# Patient Record
Sex: FEMALE | Race: WHITE | NOT HISPANIC OR LATINO | Employment: FULL TIME | URBAN - METROPOLITAN AREA
[De-identification: names, ages, dates, MRNs, and addresses within clinical notes are randomized per-mention and may not be internally consistent; named-entity substitution may affect disease eponyms.]

---
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
## 2022-09-19 ENCOUNTER — NURSE TRIAGE (OUTPATIENT)
Dept: NURSING | Facility: CLINIC | Age: 22
End: 2022-09-19

## 2022-09-19 ENCOUNTER — HOSPITAL ENCOUNTER (EMERGENCY)
Facility: CLINIC | Age: 22
Discharge: HOME | End: 2022-09-19
Attending: EMERGENCY MEDICINE | Admitting: EMERGENCY MEDICINE
Payer: COMMERCIAL

## 2022-09-19 VITALS
OXYGEN SATURATION: 98 % | DIASTOLIC BLOOD PRESSURE: 87 MMHG | RESPIRATION RATE: 20 BRPM | HEART RATE: 86 BPM | TEMPERATURE: 98.9 F | SYSTOLIC BLOOD PRESSURE: 123 MMHG

## 2022-09-19 DIAGNOSIS — J40 BRONCHITIS: ICD-10-CM

## 2022-09-19 LAB
FLUAV RNA SPEC QL NAA+PROBE: NEGATIVE
FLUBV RNA RESP QL NAA+PROBE: NEGATIVE
RSV RNA SPEC NAA+PROBE: NEGATIVE
SARS-COV-2 RNA RESP QL NAA+PROBE: NEGATIVE

## 2022-09-19 PROCEDURE — C9803 HOPD COVID-19 SPEC COLLECT: HCPCS

## 2022-09-19 PROCEDURE — 250N000012 HC RX MED GY IP 250 OP 636 PS 637: Performed by: EMERGENCY MEDICINE

## 2022-09-19 PROCEDURE — 87637 SARSCOV2&INF A&B&RSV AMP PRB: CPT | Performed by: EMERGENCY MEDICINE

## 2022-09-19 PROCEDURE — 99284 EMERGENCY DEPT VISIT MOD MDM: CPT | Mod: CS

## 2022-09-19 PROCEDURE — 93005 ELECTROCARDIOGRAM TRACING: CPT

## 2022-09-19 RX ORDER — PREDNISONE 20 MG/1
20 TABLET ORAL ONCE
Status: COMPLETED | OUTPATIENT
Start: 2022-09-19 | End: 2022-09-19

## 2022-09-19 RX ORDER — PREDNISONE 20 MG/1
40 TABLET ORAL DAILY
Qty: 8 TABLET | Refills: 0 | Status: SHIPPED | OUTPATIENT
Start: 2022-09-19 | End: 2022-09-23

## 2022-09-19 RX ADMIN — PREDNISONE 20 MG: 20 TABLET ORAL at 05:12

## 2022-09-19 ASSESSMENT — ENCOUNTER SYMPTOMS
CHILLS: 0
SHORTNESS OF BREATH: 1
ABDOMINAL PAIN: 0
NAUSEA: 0
WHEEZING: 1
CHEST TIGHTNESS: 1
VOMITING: 0
FEVER: 0
COUGH: 1

## 2022-09-19 ASSESSMENT — ACTIVITIES OF DAILY LIVING (ADL): ADLS_ACUITY_SCORE: 33

## 2022-09-19 NOTE — ED PROVIDER NOTES
History   Chief Complaint:  Shortness of Breath       HPI   Rosa Maria Melgoza is a 22 year old female with history of asthma who presents with her mother for evaluation of shortness of breath. The patient reports onset of shortness of breath around 1 week ago. She states this has worsened and she now had a nonproductive cough, chest tightness and wheezing today. She had been using her nebulizer and inhaler more at home with some relief. She was seen at [facility] yesterday where she was given prednisone 20 mg. She had a chest XR done which was clear. Her symptoms are not getting better leading to ED visit. The patient denies fever, chills, current chest pain, nausea, vomiting or abdominal pain. She is vaccinated against Covid. She denies any sick contacts.     XR chest 2 views PA and Lateral 9/18/2022: Lungs are clear. No pleural effusion. Heart size and pulmonary vascularity within normal limits.    Review of Systems   Constitutional: Negative for chills and fever.   Respiratory: Positive for cough, chest tightness, shortness of breath and wheezing.    Cardiovascular: Negative for chest pain.   Gastrointestinal: Negative for abdominal pain, nausea and vomiting.   All other systems reviewed and are negative.    Allergies:  The patient has no known allergies.     Medications:  Albuterol   Biaxin  Vestura   Duoneb  Deltasone     Past Medical History:     Depression  Severe learning disorder    Rheumatoid arthritis   Asthma     Past Surgical History:    [place] teeth extraction   Mammoplasty   Orthopedic surgery   ENT surgery      Family History:    Mother: hypothyroidism     Social History:  The patient presents to the ED with her mother  PCP: Margy Willis     Physical Exam     Patient Vitals for the past 24 hrs:   BP Temp Temp src Pulse Resp SpO2   09/19/22 0532 123/87 -- -- 86 20 98 %   09/19/22 0414 (!) 138/98 98.9  F (37.2  C) Temporal 113 20 97 %       Physical Exam  Nursing note and vitals  reviewed.  Constitutional: Well nourished.   Eyes: Conjunctiva normal.  Pupils are equal, round, and reactive to light.   ENT: Nose normal. Mucous membranes pink and moist. TM normal. No posterior oropharyngeal erythema/exudates.   Neck: Normal range of motion.  CVS: Sinus tachycardia.  Normal heart sounds.  No murmur.  Pulmonary: Lungs clear to auscultation bilaterally. No wheezes/rales/rhonchi.  GI: Abdomen soft. Nontender, nondistended. No rigidity or guarding.    MSK: No calf tenderness or swelling.  Neuro: Alert. Follows simple commands.  Skin: Skin is warm and dry. No rash noted.   Psychiatric: Normal affect.       Emergency Department Course   ECG  ECG taken at 0506,  NSR with sinus arrhythmia  Nonspecific ST abnormality   Abnormal ECG   Rate 87 bpm. VT interval 132 ms. QRS duration 96 ms. QT/QTc 360/433 ms. P-R-T axes 63 72 16.     Laboratory:  Labs Ordered and Resulted from Time of ED Arrival to Time of ED Departure   INFLUENZA A/B & SARS-COV2 PCR MULTIPLEX - Normal       Result Value    Influenza A PCR Negative      Influenza B PCR Negative      RSV PCR Negative      SARS CoV2 PCR Negative          Emergency Department Course:           Reviewed:  I reviewed nursing notes, vitals, past medical history and Care Everywhere    Assessments:  0448 I obtained history and examined the patient as noted above. Plan explained at this time    Interventions:  Prednisone, 20 mg, PO    Disposition:  The patient was discharged to home.     Impression & Plan   Medical Decision Making:  Patient is a 22-year-old female presenting with shortness of breath, cough, chest tightness and wheezing.  She is mildly tachycardic on arrival though in no significant distress.  Her repeat vital signs improved without intervention.  EKG without STEMI.  Low clinical suspicion for ACS or serious cardiopulmonary etiology.  She recently underwent a chest x-ray, I do not feel emergent blood work is warranted at this point in time.  Low  clinical suspicion for other etiology including but not limited to PE.  Strong suspicion that her presentation is more secondary to bronchitis.  I did discuss that I will increase her dose of prednisone to 40 mg daily for the next few days.  She reports taking 20 mg earlier today so an additional 20 mg given in the ED.  She has a nebulizer at home as well as albuterol.  I do not feel emergent antibiotics are warranted at this point in time.  She tested negative for COVID-19/influenza during her time in the ED.  She ambulates without hypoxia or significant work of breathing.  Monitor for fever, purulent sputum or should symptoms worsen or change to promptly represent to the ED for further evaluation.  Close PCP follow-up recommended.  Patient comfortable with plan of care.    Diagnosis:    ICD-10-CM    1. Bronchitis  J40        Discharge Medications:  New Prescriptions    PREDNISONE (DELTASONE) 20 MG TABLET    Take 2 tablets (40 mg) by mouth daily for 4 days       Scribe Disclosure:  Jimmy CANTRELL, am serving as a scribe at 4:26 AM on 9/19/2022 to document services personally performed by Melva Frye DO based on my observations and the provider's statements to me.            Melva Frye DO  09/19/22 1329

## 2022-09-19 NOTE — ED NOTES
Pt last used neb at 1130pm. Has been using inhaler non stop at home per patient. Declined offer for inhaler here.

## 2022-09-19 NOTE — ED TRIAGE NOTES
Pt to ER with c/o asthma attack, pt states diff  Than reg, pt states had covid awhile back, neg at home

## 2022-09-19 NOTE — TELEPHONE ENCOUNTER
Nurse Triage SBAR    Is this a 2nd Level Triage? NO    Situation: Patient calling with concerns of shortness of breath  .  Consent: not needed    Background:   Asthma  Assessment:   Patient states that earlier today she was seen for a really bad cough but now it has turned into shortness of breath and wheezing. Patient states they gave her a duo nebulizer for home treatment along with prednisone and has been taking clarithromycin for one week. Patient states since she has been using the clarithromycin she has been using her inhaler more. Patient states it is harder to get a breath and is taking quicker shorter breaths. She feels her inhaler isnt working much and the nebulizer is helping but still not able to get a good breath in, earlier today she was experiencing chest tightness but denies that currently.     Denies fever   Protocol Recommended Disposition:   Go to ED Now (Or PCP Triage), See More Appropriate Guideline    Recommendation: Advised patient to Go to ED now . Reviewed concerning symptoms and when to call back.         Solomon Mcclain RN [facility] 9/19/2022 3:40 AM          Reason for Disposition    [1] Wheezing (high pitched whistling sound) AND [2] previous asthma attacks or use of asthma medicines    [1] MODERATE asthma attack (e.g., SOB at rest, speaks in phrases, audible wheezes) AND [2] not resolved after 2 or 3 inhaler or nebulizer treatments given 20 minutes apart    Additional Information    Negative: SEVERE difficulty breathing (e.g., struggling for each breath, speaks in single words)    Negative: [1] Breathing stopped AND [2] hasn't returned    Negative: Choking on something    Negative: Bluish (or gray) lips or face now    Negative: Difficult to awaken or acting confused (e.g., disoriented, slurred speech)    Negative: Passed out (i.e., lost consciousness, collapsed and was not responding)    Negative: Wheezing started suddenly after medicine, an allergic food or bee  sting    Negative: Stridor    Negative: Slow, shallow and weak breathing    Negative: Sounds like a life-threatening emergency to the triager    Negative: Chest pain    Negative: SEVERE asthma attack (e.g., struggling for each breath, speaks in single words, loud wheezes, pulse >120)  (RED  Zone)    Negative: Bluish (or gray) lips or face now    Negative: Difficult to awaken or acting confused (e.g., disoriented, slurred speech)    Negative: Passed out (i.e., lost consciousness, collapsed and was not responding)    Negative: Wheezing started suddenly after medicine, an allergic food, or bee sting    Negative: Sounds like a life-threatening emergency to the triager    Negative: [1] MODERATE asthma attack (e.g., SOB at rest, speaks in phrases, audible wheezes) AND [2] doesn't have neb or inhaler available    Negative: Peak flow rate less than 50% of baseline level  (RED  Zone)    Negative: Oxygen level (e.g., pulse oximetry) 90 percent or lower    Negative: [1] Hospitalized before with asthma AND [2] feels the same now    Negative: Chest pain    Protocols used: BREATHING DIFFICULTY-A-AH, ASTHMA ATTACK-A-AH

## 2022-09-20 LAB
ATRIAL RATE - MUSE: 87 BPM
DIASTOLIC BLOOD PRESSURE - MUSE: NORMAL MMHG
INTERPRETATION ECG - MUSE: NORMAL
P AXIS - MUSE: 63 DEGREES
PR INTERVAL - MUSE: 132 MS
QRS DURATION - MUSE: 96 MS
QT - MUSE: 360 MS
QTC - MUSE: 433 MS
R AXIS - MUSE: 72 DEGREES
SYSTOLIC BLOOD PRESSURE - MUSE: NORMAL MMHG
T AXIS - MUSE: 16 DEGREES
VENTRICULAR RATE- MUSE: 87 BPM

## 2023-01-23 ENCOUNTER — HEALTH MAINTENANCE LETTER (OUTPATIENT)
Age: 23
End: 2023-01-23

## 2024-02-24 ENCOUNTER — HEALTH MAINTENANCE LETTER (OUTPATIENT)
Age: 24
End: 2024-02-24

## 2025-02-05 ENCOUNTER — TELEPHONE (OUTPATIENT)
Dept: PEDIATRICS | Facility: CLINIC | Age: 25
End: 2025-02-05

## 2025-02-05 ENCOUNTER — VIRTUAL VISIT (OUTPATIENT)
Dept: PEDIATRICS | Facility: CLINIC | Age: 25
End: 2025-02-05
Payer: COMMERCIAL

## 2025-02-05 DIAGNOSIS — E66.09 CLASS 1 OBESITY DUE TO EXCESS CALORIES WITH SERIOUS COMORBIDITY AND BODY MASS INDEX (BMI) OF 30.0 TO 30.9 IN ADULT: Primary | ICD-10-CM

## 2025-02-05 DIAGNOSIS — E66.811 CLASS 1 OBESITY DUE TO EXCESS CALORIES WITH SERIOUS COMORBIDITY AND BODY MASS INDEX (BMI) OF 30.0 TO 30.9 IN ADULT: Primary | ICD-10-CM

## 2025-02-05 DIAGNOSIS — R03.0 ELEVATED BLOOD PRESSURE READING: ICD-10-CM

## 2025-02-05 PROCEDURE — 98000 SYNCH AUDIO-VIDEO NEW SF 15: CPT | Performed by: PHYSICIAN ASSISTANT

## 2025-02-05 RX ORDER — DROSPIRENONE AND ETHINYL ESTRADIOL 0.02-3(28)
1 KIT ORAL DAILY
COMMUNITY

## 2025-02-05 ASSESSMENT — PATIENT HEALTH QUESTIONNAIRE - PHQ9
10. IF YOU CHECKED OFF ANY PROBLEMS, HOW DIFFICULT HAVE THESE PROBLEMS MADE IT FOR YOU TO DO YOUR WORK, TAKE CARE OF THINGS AT HOME, OR GET ALONG WITH OTHER PEOPLE: NOT DIFFICULT AT ALL
SUM OF ALL RESPONSES TO PHQ QUESTIONS 1-9: 0
SUM OF ALL RESPONSES TO PHQ QUESTIONS 1-9: 0

## 2025-02-05 NOTE — TELEPHONE ENCOUNTER
PRIOR AUTHORIZATION DENIED    Medication: SEMAGLUTIDE-WEIGHT MANAGEMENT 0.25 MG/0.5ML SC SOAJ  Insurance Company: [employer] - Phone [phone] Fax [phone]  Denial Date: 2/5/2025  Denial Reason(s):     Appeal Information:     Patient Notified: No

## 2025-02-05 NOTE — PROGRESS NOTES
"Rosa Maria is a 24 year old who is being evaluated via a billable video visit.    How would you like to obtain your AVS? MyChart  If the video visit is dropped, the invitation should be resent by: Send to e-mail at: [email or web address]  Will anyone else be joining your video visit? No      Assessment & Plan     Class 1 obesity due to excess calories with serious comorbidity and body mass index (BMI) of 30.0 to 30.9 in adult  Pt failed diet, exercise. Thyroid normal.  Overall good diet.  Has elevated bp  Would like to try GLP if covered  If not pt is willing to try Wellbutrin and naltrexone  Discussed side effect profile of both choices  Continue to work on heart healthy diet and exercise.     - semaglutide-weight management (WEGOVY) 0.25 MG/0.5ML pen  Dispense: 2 mL; Refill: 0    Elevated blood pressure reading  Schedule nurse only bp and weight check                  Subjective   Rosa Maria is a 24 year old, presenting for the following health issues:  Weight Check    Trying to loose weight. Working out, dieting, not seeing results.  Is wondering about the shot.  Has not tried formal programs in past.  Exercises- [employer] league once a week. Walks treadmill  Diet- not a snacker,   She does like carbs, not much for sweets.   Eats lean means, more protein heavy.  Salads 4-5x a week with a meal.   Drink- water, occasional coke.    Current weight  Wt-176#  Ht- 5'3\"  BMI 31.8            2/5/2025     5:10 PM   Additional Questions   Roomed by Tabatha TRACY   Accompanied by Self         2/5/2025     5:10 PM   Patient Reported Additional Medications   Patient reports taking the following new medications No     History of Present Illness       Reason for visit:  Weight managment  Symptom onset:  Today  Symptoms include:  NA  : NA.  : NA.  : NA.  Prior treatment description:  NA  What makes it worse:  NA  What makes it better:  NA    She eats 0-1 servings of fruits and vegetables daily.She consumes 0 sweetened beverage(s) daily.She " "exercises with enough effort to increase her heart rate 60 or more minutes per day.  She exercises with enough effort to increase her heart rate 3 or less days per week.   She is taking medications regularly.                   Objective    Vitals - Patient Reported  Weight (Patient Reported): 75.8 kg (167 lb)  Height (Patient Reported): 160 cm (5' 3\")  BMI (Based on Pt Reported Ht/Wt): 29.58  Pain Score: No Pain (0)        Physical Exam   GENERAL: alert and no distress  EYES: Eyes grossly normal to inspection.  No discharge or erythema, or obvious scleral/conjunctival abnormalities.  RESP: No audible wheeze, cough, or visible cyanosis.    SKIN: Visible skin clear. No significant rash, abnormal pigmentation or lesions.  NEURO: Cranial nerves grossly intact.  Mentation and speech appropriate for age.  PSYCH: Appropriate affect, tone, and pace of words          Video-Visit Details    Type of service:  Video Visit   Originating Location (pt. Location): Home    Distant Location (provider location):  On-site  Platform used for Video Visit: [place]  Signed Electronically by: Erlinda Iglesias PA-C    "

## 2025-02-06 NOTE — TELEPHONE ENCOUNTER
"The denial states that plan covers if she has a BMI of 30 or greater. She does, please reference my note from yesterday.      Current weight as of yesterday:  Wt-176#  Ht- 5'3\"  BMI 31.8    Erlinda Iglesias PA-C on 2/6/2025 at 10:34 AM     "

## 2025-02-06 NOTE — TELEPHONE ENCOUNTER
[facility] Pool15 hours ago (5:53 PM)       PA DENIED  Please close encounter when finished.  If provider would like to appeal we will need a detailed letter of medical necessity to start the process.  Thank you  RPPA (Retail Pharmacy Prior Authorization) Team

## 2025-03-09 ENCOUNTER — HEALTH MAINTENANCE LETTER (OUTPATIENT)
Age: 25
End: 2025-03-09

## 2025-03-10 ENCOUNTER — MYC MEDICAL ADVICE (OUTPATIENT)
Dept: PEDIATRICS | Facility: CLINIC | Age: 25
End: 2025-03-10
Payer: COMMERCIAL

## 2025-03-10 DIAGNOSIS — E66.09 CLASS 1 OBESITY DUE TO EXCESS CALORIES WITH SERIOUS COMORBIDITY AND BODY MASS INDEX (BMI) OF 30.0 TO 30.9 IN ADULT: Primary | ICD-10-CM

## 2025-03-10 DIAGNOSIS — E66.811 CLASS 1 OBESITY DUE TO EXCESS CALORIES WITH SERIOUS COMORBIDITY AND BODY MASS INDEX (BMI) OF 30.0 TO 30.9 IN ADULT: Primary | ICD-10-CM

## 2025-03-11 NOTE — TELEPHONE ENCOUNTER
Please call pt to get her current weight so I can fill med.  Erlinda Iglesias PA-C on 3/11/2025 at 2:32 PM

## 2025-03-11 NOTE — TELEPHONE ENCOUNTER
Called and spoke with patient. Reports updated weight of 167 lbs. Documented in chart.     Laxmi Camarillo on 3/11/2025 at 5:02 PM

## 2025-03-12 NOTE — TELEPHONE ENCOUNTER
Wt 176 when she started  Current weight 167#  Sent next two doses.   Erlinda Iglesias PA-C on 3/12/2025 at 8:05 AM

## 2025-04-22 ENCOUNTER — MYC MEDICAL ADVICE (OUTPATIENT)
Dept: PEDIATRICS | Facility: CLINIC | Age: 25
End: 2025-04-22
Payer: COMMERCIAL

## 2025-05-21 ENCOUNTER — MYC MEDICAL ADVICE (OUTPATIENT)
Dept: PEDIATRICS | Facility: CLINIC | Age: 25
End: 2025-05-21
Payer: COMMERCIAL

## 2025-05-21 DIAGNOSIS — E66.811 CLASS 1 OBESITY DUE TO EXCESS CALORIES WITH SERIOUS COMORBIDITY AND BODY MASS INDEX (BMI) OF 30.0 TO 30.9 IN ADULT: Primary | ICD-10-CM

## 2025-05-21 DIAGNOSIS — E66.09 CLASS 1 OBESITY DUE TO EXCESS CALORIES WITH SERIOUS COMORBIDITY AND BODY MASS INDEX (BMI) OF 30.0 TO 30.9 IN ADULT: Primary | ICD-10-CM

## 2025-05-22 NOTE — TELEPHONE ENCOUNTER
"See patient's [employer] message   - Patient states that her current weight is 147 pounds   - Patient requesting to increase to the next higher dose of Wegovy     Upon chart review:  - 2/5/2025 virtual visit with ZOFIA Carney CNP states:     \"Class 1 obesity due to excess calories with serious comorbidity and body mass index (BMI) of 30.0 to 30.9 in adult  Pt failed diet, exercise. Thyroid normal.  Overall good diet.  Has elevated bp  Would like to try GLP if covered  If not pt is willing to try Wellbutrin and naltrexone  Discussed side effect profile of both choices  Continue to work on heart healthy diet and exercise.\"    \"Current weight  Wt-176#  Ht- 5'3\"  BMI 31.8\"    - 5/2/2025 virtual visit with ZOFIA Carney CNP states:     \"Class 1 obesity due to excess calories with serious comorbidity and body mass index (BMI) of 30.0 to 30.9 in adult  Having weight loss  Tolerates medicaiton  No current concerns  Would like to continue  She is working on diet  Will start to work on resistance exercise as well  Refilled today at 1.7 with one refill  Call when she wants a refill, she needs to let us know her current weight and if she wants dose change\"    Semaglutide-Weight Management (WEGOVY) 1.7 MG/0.75ML pen 3 mL 1 5/2/2025 -- No   Sig - Route: Inject 1.7 mg subcutaneously once a week. - Subcutaneous     Per UpToDate:     SUBQ: Initiate and adjust dose using the following schedule: In patients who do not tolerate a dosage increase, consider delaying the increase for an additional 4 weeks:  Week 1 through week 4 : 0.25 mg once weekly.  Week 5 through week 8: 0.5 mg once weekly.  Week 9 through week 12: 1 mg once weekly.  Week 13 through week 16: 1.7 mg once weekly.  Week 17 and thereafter (maintenance dosage): 2.4 mg once weekly (preferred regimen); if not tolerated, may use an alternative maintenance dosage of 1.7 mg once weekly.    - Pended Wegovy 2.4 mg dose with the [id number] - [place], [place] " - [id number]  PAULINE Caraballo, APRN CNP, please review and refill as appropriate.     Betty DON RN   [facility] [place]

## 2025-08-28 ENCOUNTER — MYC MEDICAL ADVICE (OUTPATIENT)
Dept: PEDIATRICS | Facility: CLINIC | Age: 25
End: 2025-08-28
Payer: COMMERCIAL

## 2025-08-28 DIAGNOSIS — E66.09 CLASS 1 OBESITY DUE TO EXCESS CALORIES WITH SERIOUS COMORBIDITY AND BODY MASS INDEX (BMI) OF 30.0 TO 30.9 IN ADULT: ICD-10-CM

## 2025-08-28 DIAGNOSIS — E66.811 CLASS 1 OBESITY DUE TO EXCESS CALORIES WITH SERIOUS COMORBIDITY AND BODY MASS INDEX (BMI) OF 30.0 TO 30.9 IN ADULT: ICD-10-CM

## (undated) DEVICE — SUCTION CANISTER MEDIVAC LINER 3000ML W/LID 65651-530

## (undated) DEVICE — GLOVE PROTEXIS W/NEU-THERA 6.5  2D73TE65

## (undated) DEVICE — SU MONOCRYL 4-0 PS-2 18" UND Y496G

## (undated) DEVICE — PREP SKIN SCRUB TRAY 4461A

## (undated) DEVICE — SYR 03ML LL W/O NDL 309657

## (undated) DEVICE — GLOVE PROTEXIS W/NEU-THERA 8.0  2D73TE80

## (undated) DEVICE — BLADE KNIFE SURG 10 371110

## (undated) DEVICE — SU MONOCRYL 3-0 PS-2 27" Y427H

## (undated) DEVICE — DRAPE BREAST/CHEST 29420

## (undated) DEVICE — LINEN TOWEL PACK X5 5464

## (undated) DEVICE — PAD CHUX UNDERPAD 23X24" 7136

## (undated) DEVICE — ESU PENCIL W/SMOKE EVAC E2515HS

## (undated) DEVICE — PACK MAJOR SBA15MAFSI

## (undated) DEVICE — DRSG ABDOMINAL 12X16"

## (undated) DEVICE — SOL WATER IRRIG 1000ML BOTTLE 2F7114

## (undated) DEVICE — GLOVE PROTEXIS W/NEU-THERA 7.0  2D73TE70

## (undated) DEVICE — GLOVE PROTEXIS BLUE W/NEU-THERA 6.0  2D73EB60

## (undated) DEVICE — DRSG STERI STRIP 1/2X4" R1547

## (undated) DEVICE — SOL NACL 0.9% IRRIG 1000ML BOTTLE 07138-09

## (undated) DEVICE — MAMMARY SUPPORT SM LATEX

## (undated) DEVICE — SU VICRYL 2-0 FS-1 27" UND  J443H

## (undated) DEVICE — NDL SPINAL 22GA 3.5" QUINCKE 405181

## (undated) RX ORDER — FENTANYL CITRATE 50 UG/ML
INJECTION, SOLUTION INTRAMUSCULAR; INTRAVENOUS
Status: DISPENSED
Start: 2018-07-24

## (undated) RX ORDER — LIDOCAINE HYDROCHLORIDE 20 MG/ML
INJECTION, SOLUTION EPIDURAL; INFILTRATION; INTRACAUDAL; PERINEURAL
Status: DISPENSED
Start: 2018-07-24

## (undated) RX ORDER — HYDROXYZINE HYDROCHLORIDE 25 MG/1
TABLET, FILM COATED ORAL
Status: DISPENSED
Start: 2018-07-24

## (undated) RX ORDER — ONDANSETRON 2 MG/ML
INJECTION INTRAMUSCULAR; INTRAVENOUS
Status: DISPENSED
Start: 2018-07-24

## (undated) RX ORDER — KETOROLAC TROMETHAMINE 30 MG/ML
INJECTION, SOLUTION INTRAMUSCULAR; INTRAVENOUS
Status: DISPENSED
Start: 2018-07-24

## (undated) RX ORDER — PROPOFOL 10 MG/ML
INJECTION, EMULSION INTRAVENOUS
Status: DISPENSED
Start: 2018-07-24

## (undated) RX ORDER — GLYCOPYRROLATE 0.2 MG/ML
INJECTION, SOLUTION INTRAMUSCULAR; INTRAVENOUS
Status: DISPENSED
Start: 2018-07-24

## (undated) RX ORDER — CELECOXIB 200 MG/1
CAPSULE ORAL
Status: DISPENSED
Start: 2018-07-24

## (undated) RX ORDER — CEFAZOLIN SODIUM 2 G/100ML
INJECTION, SOLUTION INTRAVENOUS
Status: DISPENSED
Start: 2018-07-24

## (undated) RX ORDER — SCOLOPAMINE TRANSDERMAL SYSTEM 1 MG/1
PATCH, EXTENDED RELEASE TRANSDERMAL
Status: DISPENSED
Start: 2018-07-24

## (undated) RX ORDER — DEXAMETHASONE SODIUM PHOSPHATE 4 MG/ML
INJECTION, SOLUTION INTRA-ARTICULAR; INTRALESIONAL; INTRAMUSCULAR; INTRAVENOUS; SOFT TISSUE
Status: DISPENSED
Start: 2018-07-24